# Patient Record
Sex: MALE | Race: WHITE | NOT HISPANIC OR LATINO | URBAN - METROPOLITAN AREA
[De-identification: names, ages, dates, MRNs, and addresses within clinical notes are randomized per-mention and may not be internally consistent; named-entity substitution may affect disease eponyms.]

---

## 2022-07-24 PROBLEM — G31.84 MILD COGNITIVE IMPAIRMENT: Status: ACTIVE | Noted: 2022-07-24

## 2022-07-24 PROBLEM — R29.3 POSTURAL INSTABILITY: Status: ACTIVE | Noted: 2022-07-24

## 2022-07-24 PROBLEM — R53.83 OTHER FATIGUE: Status: ACTIVE | Noted: 2022-07-24

## 2022-07-24 PROBLEM — G25.0 FAMILIAL TREMOR: Status: ACTIVE | Noted: 2022-07-24

## 2022-07-24 PROBLEM — G20.C PARKINSONISM: Status: ACTIVE | Noted: 2022-07-24

## 2022-07-24 PROBLEM — G47.52 REM BEHAVIORAL DISORDER: Status: ACTIVE | Noted: 2022-07-24

## 2022-11-22 PROBLEM — R26.81 GAIT INSTABILITY: Status: ACTIVE | Noted: 2022-11-22

## 2023-03-06 PROBLEM — E66.01 SEVERE OBESITY (BMI 35.0-39.9) WITH COMORBIDITY: Status: ACTIVE | Noted: 2023-03-06

## 2023-07-19 ENCOUNTER — TELEPHONE (OUTPATIENT)
Dept: UROLOGY | Facility: CLINIC | Age: 68
End: 2023-07-19

## 2023-07-19 NOTE — TELEPHONE ENCOUNTER
Pt calling to see if we approve financial assistance. I spoke with Patrica and she advised pt to call central billing, billing number given to pt. She will call back to schedule deirdre.     ----- Message from Shreya Moore sent at 7/19/2023  9:34 AM CDT -----  Type:  Needs Medical Advice    Who Called: Alonzo Tian ( pt's wife , Gabriela )      Symptoms (please be specific): -   How long has patient had these symptoms:  -  Pharmacy name and phone #:  -  Would the patient rather a call back or a response via [facility]?    Best Call Back Number: [phone]  Additional Information: wants to know if Dr Muñiz accepts [facility]'s financial assistance  she wants to find a urologist closer to home please call